# Patient Record
Sex: FEMALE | Race: WHITE
[De-identification: names, ages, dates, MRNs, and addresses within clinical notes are randomized per-mention and may not be internally consistent; named-entity substitution may affect disease eponyms.]

---

## 2019-03-20 ENCOUNTER — HOSPITAL ENCOUNTER (OUTPATIENT)
Dept: HOSPITAL 59 - SUR | Age: 49
Discharge: HOME | End: 2019-03-20
Attending: ORTHOPAEDIC SURGERY
Payer: COMMERCIAL

## 2019-03-20 DIAGNOSIS — E03.9: ICD-10-CM

## 2019-03-20 DIAGNOSIS — K21.9: ICD-10-CM

## 2019-03-20 DIAGNOSIS — J44.9: ICD-10-CM

## 2019-03-20 DIAGNOSIS — S92.352A: Primary | ICD-10-CM

## 2019-03-21 NOTE — OPERATIVE NOTE
DATE OF SURGERY: 03/20/2019 



PREOPERATIVE DIAGNOSIS: Freire fracture, left 5th metatarsal. 



POSTOPERATIVE DIAGNOSIS: Freire fracture, left 5th metatarsal. 



OPERATION: Open reduction and internal fixation of left 5th metatarsal with 4.0 cannulated screw. 



Staff Surgeon: Deshawn Argueta MD 



Anesthesia: General. 



Preparation: Chloraprep. 



Individual Considerations: None. 



PROCEDURE: The patient was taken to the operating room and placed supine on the operating room 
table. She had a successful induction of a general anesthetic. Her left lower extremity was prepped 
and draped in the usual fashion. 



The patient had a guide pin for the 4.0 cannulated screw placed starting at the tip at the base of 
the 5th metatarsal and then into the shaft. This was done by fluoroscopy. I went ahead and reamed 
the proximal portion with the shank reamer and then placed a 46 mm cannulated screw to give 
compressive osteosynthesis across the fracture site as verified by fluoroscopy. After irrigation, 
the skin was closed with staples. I did have to open the skin with a knife and make about a 
centimeter incision. I then infiltrated the skin, subcu, and fracture site with 0.5% Marcaine with 
epinephrine and a sterile dressing was applied. She was taken back to recovery in good condition. 
There were no complications. 

ROGELIO